# Patient Record
Sex: MALE | Race: WHITE | NOT HISPANIC OR LATINO
[De-identification: names, ages, dates, MRNs, and addresses within clinical notes are randomized per-mention and may not be internally consistent; named-entity substitution may affect disease eponyms.]

---

## 2020-10-19 PROBLEM — Z00.00 ENCOUNTER FOR PREVENTIVE HEALTH EXAMINATION: Status: ACTIVE | Noted: 2020-10-19

## 2020-11-17 ENCOUNTER — APPOINTMENT (OUTPATIENT)
Dept: UROLOGY | Facility: CLINIC | Age: 50
End: 2020-11-17

## 2022-03-08 ENCOUNTER — APPOINTMENT (OUTPATIENT)
Dept: GASTROENTEROLOGY | Facility: CLINIC | Age: 52
End: 2022-03-08
Payer: COMMERCIAL

## 2022-03-08 DIAGNOSIS — I25.10 ATHEROSCLEROTIC HEART DISEASE OF NATIVE CORONARY ARTERY W/OUT ANGINA PECTORIS: ICD-10-CM

## 2022-03-08 DIAGNOSIS — Z12.11 ENCOUNTER FOR SCREENING FOR MALIGNANT NEOPLASM OF COLON: ICD-10-CM

## 2022-03-08 DIAGNOSIS — I73.9 PERIPHERAL VASCULAR DISEASE, UNSPECIFIED: ICD-10-CM

## 2022-03-08 DIAGNOSIS — Z90.49 ACQUIRED ABSENCE OF OTHER SPECIFIED PARTS OF DIGESTIVE TRACT: ICD-10-CM

## 2022-03-08 PROCEDURE — 99214 OFFICE O/P EST MOD 30 MIN: CPT | Mod: 95

## 2022-03-08 NOTE — ASSESSMENT
[FreeTextEntry1] : current meds...colchicine, as a trial for his elevated crp\par finasteride for prostatism\par niaspan\par vit c, vit d\par \par hold the morning of the procedure, hold your xarelto\par but take the xarelto the night before, just about six pm\par \par do not hold the plavix at all\par check with cardiologist\par \par re our plan but its very minimal interruption of anti coag regimen, just holding xarelto for two hours or so...\par \par occas diarrhea on colchicine

## 2022-03-08 NOTE — HISTORY OF PRESENT ILLNESS
[FreeTextEntry1] : the patient is making a visit for colonoscopy.\par this is a telehealth visit, consent on file, audio and video\par just patient and i are on the call\par patient would like to set up his first colonoscopy.\par \par the medical issue;\par he has coronary disease, and peripheral artery disease\par a coronary bypass was done some ten years ago, and was successful\par he also had peripheral artery disease, has undergone numerous stenting procedures, perhaps over 25 peripheral vascular disease\par procedures\par for a number of years, he was on aspirin and plavix\par now, he has recently been placed on Xarelto and plavix\par the xarelto is bid\par meanwhile this is a new intervention\par question raised;\par since the last vascular procedure was just two months ago, does his cardiologist agree with our plan to just give xarelto early, at six pm, and do a nine am procedure, so patient is fifteen hours post his last dose of xarelto

## 2022-06-27 ENCOUNTER — RESULT REVIEW (OUTPATIENT)
Age: 52
End: 2022-06-27

## 2022-06-29 ENCOUNTER — RESULT REVIEW (OUTPATIENT)
Age: 52
End: 2022-06-29

## 2022-06-30 ENCOUNTER — TRANSCRIPTION ENCOUNTER (OUTPATIENT)
Age: 52
End: 2022-06-30

## 2022-06-30 ENCOUNTER — APPOINTMENT (OUTPATIENT)
Dept: GASTROENTEROLOGY | Facility: HOSPITAL | Age: 52
End: 2022-06-30

## 2025-07-29 ENCOUNTER — APPOINTMENT (OUTPATIENT)
Dept: GASTROENTEROLOGY | Facility: CLINIC | Age: 55
End: 2025-07-29
Payer: COMMERCIAL

## 2025-07-29 DIAGNOSIS — I25.10 ATHEROSCLEROTIC HEART DISEASE OF NATIVE CORONARY ARTERY W/OUT ANGINA PECTORIS: ICD-10-CM

## 2025-07-29 DIAGNOSIS — Z86.0100 PERSONAL HISTORY OF COLON POLYPS, UNSPECIFIED: ICD-10-CM

## 2025-07-29 DIAGNOSIS — I73.9 PERIPHERAL VASCULAR DISEASE, UNSPECIFIED: ICD-10-CM

## 2025-07-29 PROCEDURE — 99203 OFFICE O/P NEW LOW 30 MIN: CPT | Mod: 95

## 2025-07-29 RX ORDER — SODIUM PICOSULFATE, MAGNESIUM OXIDE, AND ANHYDROUS CITRIC ACID 12; 3.5; 1 G/175ML; G/175ML; MG/175ML
10-3.5-12 MG-GM LIQUID ORAL
Qty: 2 | Refills: 1 | Status: ACTIVE | COMMUNITY
Start: 2025-07-29 | End: 1900-01-01